# Patient Record
Sex: MALE | Race: WHITE | Employment: FULL TIME | URBAN - METROPOLITAN AREA
[De-identification: names, ages, dates, MRNs, and addresses within clinical notes are randomized per-mention and may not be internally consistent; named-entity substitution may affect disease eponyms.]

---

## 2017-08-17 ENCOUNTER — OFFICE VISIT (OUTPATIENT)
Dept: FAMILY MEDICINE | Facility: CLINIC | Age: 22
End: 2017-08-17
Payer: COMMERCIAL

## 2017-08-17 VITALS
TEMPERATURE: 97.7 F | BODY MASS INDEX: 23.38 KG/M2 | WEIGHT: 167 LBS | SYSTOLIC BLOOD PRESSURE: 126 MMHG | OXYGEN SATURATION: 96 % | DIASTOLIC BLOOD PRESSURE: 64 MMHG | HEART RATE: 51 BPM | HEIGHT: 71 IN

## 2017-08-17 DIAGNOSIS — R07.0 THROAT PAIN: Primary | ICD-10-CM

## 2017-08-17 LAB
DEPRECATED S PYO AG THROAT QL EIA: NORMAL
SPECIMEN SOURCE: NORMAL

## 2017-08-17 PROCEDURE — 99213 OFFICE O/P EST LOW 20 MIN: CPT | Performed by: NURSE PRACTITIONER

## 2017-08-17 PROCEDURE — 87880 STREP A ASSAY W/OPTIC: CPT | Performed by: NURSE PRACTITIONER

## 2017-08-17 PROCEDURE — 87081 CULTURE SCREEN ONLY: CPT | Performed by: NURSE PRACTITIONER

## 2017-08-17 NOTE — MR AVS SNAPSHOT
After Visit Summary   8/17/2017    Tyshawn Gonzalez    MRN: [id number]           Patient Information     Date Of Birth          1995        Visit Information        Provider Department      8/17/2017 11:20 AM Kiersten Shields APRN [facility]        Today's Diagnoses     Throat pain    -  1      Care Instructions    Increase rest and fluids. Tylenol and/or Ibuprofen for comfort. Cool mist vaporizer. If your symptoms worsen or do not resolve follow up with your primary care provider as needed     Mucinex 600 mg 12 hr formula can help with post nasal drip which can cause a very sore throat.     Indications for emergent return to emergency department discussed with patient, who verbalized good understanding and agreement.  Patient understands the limitations of today's evaluation.           Self-Care for Sore Throats    Sore throats happen for many reasons, such as colds, allergies, and infections caused by viruses or bacteria. In any case, your throat becomes red and sore. Your goal for self-care is to reduce your discomfort while giving your throat a chance to heal.  Moisten and soothe your throat  Tips include the following:    Try a sip of water first thing after waking up.    Keep your throat moist by drinking 6 or more glasses of clear liquids every day.    Run a cool-air humidifier in your room overnight.    Avoid cigarette smoke.     Suck on throat lozenges, cough drops, hard candy, ice chips, or frozen fruit-juice bars. Use the sugar-free versions if your diet or medical condition requires them.  Gargle to ease irritation  Gargling every hour or 2 can ease irritation. Try gargling with 1 of these solutions:    1/4 teaspoon of salt in 1/2 cup of warm water    An over-the-counter anesthetic gargle  Use medicine for more relief  Over-the-counter medicine can reduce sore throat symptoms. Ask your pharmacist if you have questions about which medicine to use:    Ease pain  with anesthetic sprays. Aspirin or an aspirin substitute also helps. Remember, never give aspirin to anyone 18 or younger, or if you are already taking blood thinners.     For sore throats caused by allergies, try antihistamines to block the allergic reaction.    Remember: unless a sore throat is caused by a bacterial infection, antibiotics won t help you.  Prevent future sore throats  Prevention tips include the following:    Stop smoking or reduce contact with secondhand smoke. Smoke irritates the tender throat lining.    Limit contact with pets and with allergy-causing substances, such as pollen and mold.    When you re around someone with a sore throat or cold, wash your hands often to keep viruses or bacteria from spreading.    Don t strain your vocal cords.  Call your healthcare provider  Contact your healthcare provider if you have:    A temperature over 101 F (38.3 C)    White spots on the throat    Great difficulty swallowing    Trouble breathing    A skin rash    Recent exposure to someone else with strep bacteria    Severe hoarseness and swollen glands in the neck or jaw   Date Last Reviewed: 8/1/2016 2000-2017 [employer]). [address] [place] [address]. All rights reserved. This information is not intended as a substitute for professional medical care. Always follow your healthcare professional's instructions.                Follow-ups after your visit        Additional Services     OTOLARYNGOLOGY REFERRAL       Your provider has referred you to: [facility]: [facility] [phone]   [email or web address]    Pt requesting ENT referral as he is afraid he has cancer in his tonsils because he states at times they look and feel terrible.  History of smoking.  Normal exam today.      Please be aware that coverage of these services is subject to the terms and limitations of your health insurance plan.  Call member services at your health plan  "with any benefit or coverage questions.      Please bring the following with you to your appointment:    (1) Any X-Rays, CTs or MRIs which have been performed.  Contact the facility where they were done to arrange for  prior to your scheduled appointment.   (2) List of current medications  (3) This referral request   (4) Any documents/labs given to you for this referral                  Follow-up notes from your care team     See patient instructions section of the AVS Return if symptoms worsen or fail to improve.      Who to contact     If you have questions or need follow up information about today's clinic visit or your schedule please contact [facility] directly at [phone].  Normal or non-critical lab and imaging results will be communicated to you by MyChart, letter or phone within 4 business days after the clinic has received the results. If you do not hear from us within 7 days, please contact the clinic through MyChart or phone. If you have a critical or abnormal lab result, we will notify you by phone as soon as possible.  Submit refill requests through [employer] or call your pharmacy and they will forward the refill request to us. Please allow 3 business days for your refill to be completed.          Additional Information About Your Visit        MyChart Information     Balanced lets you send messages to your doctor, view your test results, renew your prescriptions, schedule appointments and more. To sign up, go to [email or web address] . Click on \"Log in\" on the left side of the screen, which will take you to the Welcome page. Then click on \"Sign up Now\" on the right side of the page.     You will be asked to enter the access code listed below, as well as some personal information. Please follow the directions to create your username and password.     Your access code is: [email or web address]  Expires: 11/15/2017 11:49 AM     Your access code will  in 90 days. If you need help " "or a new code, please call your [place] clinic or [phone].        Care EveryWhere ID     This is your Care EveryWhere ID. This could be used by other organizations to access your [place] medical records  [id number]        Your Vitals Were     Pulse Temperature Height Pulse Oximetry BMI (Body Mass Index)       51 97.7  F (36.5  C) (Tympanic) 5' 11\" (1.803 m) 96% 23.29 kg/m2        Blood Pressure from Last 3 Encounters:   08/17/17 126/64   09/20/16 126/72    Weight from Last 3 Encounters:   08/17/17 167 lb (75.8 kg)              We Performed the Following     Beta strep group A culture     OTOLARYNGOLOGY REFERRAL     Strep, Rapid Screen        Primary Care Provider    None       No address on file        Equal Access to Services     EUNICE GARCIA : Hademily Harrison, waaxda luqadaha, qaybta kaalmada adeegyada, isaias gaona . So [facility] [phone].    ATENCIÓN: Si habla español, tiene a appiah disposición servicios gratuitos de asistencia lingüística. Llame al [phone].    We comply with applicable federal civil rights laws and [place] laws. We do not discriminate on the basis of race, color, national origin, age, disability sex, sexual orientation or gender identity.            Thank you!     Thank you for choosing [facility]  for your care. Our goal is always to provide you with excellent care. Hearing back from our patients is one way we can continue to improve our services. Please take a few minutes to complete the written survey that you may receive in the mail after your visit with us. Thank you!             Your Updated Medication List - Protect others around you: Learn how to safely use, store and throw away your medicines at www.disposemymeds.org.      Notice  As of 8/17/2017 12:01 PM    You have not been prescribed any medications.      "

## 2017-08-17 NOTE — LETTER
August 18, 2017      Tyshawn Gonzalez  [facility] [address]        Dear Tyshawn,           This letter is to inform you that the results of your recent throat culture are negative.  If you have any questions please call or make an appointment.            Sincerely,        VANNESA Limon CNP

## 2017-08-17 NOTE — PROGRESS NOTES
"  SUBJECTIVE:                                                    Tyshawn Gonzalez is a 22 year old male who presents to clinic today for the following health issues:      Sore throat       Duration: [place]- been bad ever since just never made it in for an appointment     Description (location/character/radiation): throat     Intensity:  mild, moderate    Accompanying signs and symptoms: Tonsils feel beat up and would like them to get checked out     History (similar episodes/previous evaluation): None    Precipitating or alleviating factors: None    Therapies tried and outcome: None     No sore throat today. Only happens once in awhile.        Problem list and histories reviewed & adjusted, as indicated.  Additional history: as documented    There is no problem list on file for this patient.    History reviewed. No pertinent surgical history.    Social History   Substance Use Topics     Smoking status: Current Every Day Smoker     Packs/day: 0.25     Years: 5.00     Smokeless tobacco: Former User     Alcohol use Not on file     History reviewed. No pertinent family history.      No current outpatient prescriptions on file.     No Known Allergies  Labs reviewed in EPIC      Reviewed and updated as needed this visit by clinical staffTobacco  Allergies  Meds  Problems  Med Hx  Surg Hx  Fam Hx  Soc Hx        Reviewed and updated as needed this visit by Provider  Allergies  Meds  Problems         ROS:  Constitutional, HEENT, cardiovascular, pulmonary, GI, , musculoskeletal, neuro, skin, endocrine and psych systems are negative, except as otherwise noted.      OBJECTIVE:   /64  Pulse 51  Temp 97.7  F (36.5  C) (Tympanic)  Ht 5' 11\" (1.803 m)  Wt 167 lb (75.8 kg)  SpO2 96%  BMI 23.29 kg/m2  Body mass index is 23.29 kg/(m^2).   GENERAL: healthy, alert and no distress, nontoxic in appearance  EYES: Eyes grossly normal to inspection, PERRL and conjunctivae and sclerae normal  HENT: ear canals and TM's " normal, nose and mouth without ulcers or lesions  NECK: no adenopathy, supple with full ROM  RESP: lungs clear to auscultation - no rales, rhonchi or wheezes  CV: regular rate and rhythm, normal S1 S2, no S3 or S4, no murmur, click or rub, no peripheral edema   ABDOMEN: soft, nontender, no hepatosplenomegaly, no masses and bowel sounds normal  MS: no gross musculoskeletal defects noted, no edema  No rash    Diagnostic Test Results:  Results for orders placed or performed in visit on 08/17/17 (from the past 24 hour(s))   Strep, Rapid Screen   Result Value Ref Range    Specimen Description Throat     Rapid Strep A Screen       NEGATIVE: No Group A streptococcal antigen detected by immunoassay, await culture report.       ASSESSMENT/PLAN:   Pt wants a referral to specialist as he is afraid he has cancer in his tonsils. I did explain to him that he has normal exam today and his throat is not sore today. He is very worried. Will refer to ENT.  Problem List Items Addressed This Visit     None      Visit Diagnoses     Throat pain    -  Primary    Relevant Orders    Strep, Rapid Screen (Completed)    Beta strep group A culture (Completed)    OTOLARYNGOLOGY REFERRAL               Patient Instructions     Increase rest and fluids. Tylenol and/or Ibuprofen for comfort. Cool mist vaporizer. If your symptoms worsen or do not resolve follow up with your primary care provider as needed     Mucinex 600 mg 12 hr formula can help with post nasal drip which can cause a very sore throat.     Indications for emergent return to emergency department discussed with patient, who verbalized good understanding and agreement.  Patient understands the limitations of today's evaluation.           Self-Care for Sore Throats    Sore throats happen for many reasons, such as colds, allergies, and infections caused by viruses or bacteria. In any case, your throat becomes red and sore. Your goal for self-care is to reduce your discomfort while giving  your throat a chance to heal.  Moisten and soothe your throat  Tips include the following:    Try a sip of water first thing after waking up.    Keep your throat moist by drinking 6 or more glasses of clear liquids every day.    Run a cool-air humidifier in your room overnight.    Avoid cigarette smoke.     Suck on throat lozenges, cough drops, hard candy, ice chips, or frozen fruit-juice bars. Use the sugar-free versions if your diet or medical condition requires them.  Gargle to ease irritation  Gargling every hour or 2 can ease irritation. Try gargling with 1 of these solutions:    1/4 teaspoon of salt in 1/2 cup of warm water    An over-the-counter anesthetic gargle  Use medicine for more relief  Over-the-counter medicine can reduce sore throat symptoms. Ask your pharmacist if you have questions about which medicine to use:    Ease pain with anesthetic sprays. Aspirin or an aspirin substitute also helps. Remember, never give aspirin to anyone 18 or younger, or if you are already taking blood thinners.     For sore throats caused by allergies, try antihistamines to block the allergic reaction.    Remember: unless a sore throat is caused by a bacterial infection, antibiotics won t help you.  Prevent future sore throats  Prevention tips include the following:    Stop smoking or reduce contact with secondhand smoke. Smoke irritates the tender throat lining.    Limit contact with pets and with allergy-causing substances, such as pollen and mold.    When you re around someone with a sore throat or cold, wash your hands often to keep viruses or bacteria from spreading.    Don t strain your vocal cords.  Call your healthcare provider  Contact your healthcare provider if you have:    A temperature over 101 F (38.3 C)    White spots on the throat    Great difficulty swallowing    Trouble breathing    A skin rash    Recent exposure to someone else with strep bacteria    Severe hoarseness and swollen glands in the neck or  jaw   Date Last Reviewed: 8/1/2016 2000-2017 [employer]. [address] [place] [address], [place] [address]. All rights reserved. This information is not intended as a substitute for professional medical care. Always follow your healthcare professional's instructions.            VANNESA Limon [facility]

## 2017-08-17 NOTE — NURSING NOTE
"Chief Complaint   Patient presents with     Pharyngitis       Initial /64  Pulse 51  Temp 97.7  F (36.5  C) (Tympanic)  Ht 5' 11\" (1.803 m)  Wt 167 lb (75.8 kg)  SpO2 96%  BMI 23.29 kg/m2 Estimated body mass index is 23.29 kg/(m^2) as calculated from the following:    Height as of this encounter: 5' 11\" (1.803 m).    Weight as of this encounter: 167 lb (75.8 kg).  Medication Reconciliation: complete    Health Maintenance that is potentially due pending provider review:  Immuniaztions     Patient will do health maintenance another time as he is not feeling good today. Matthew Donaldson, prashanth    Is there anyone who you would like to be able to receive your results? No  If yes have patient fill out RACHID      "

## 2017-08-17 NOTE — PATIENT INSTRUCTIONS
Increase rest and fluids. Tylenol and/or Ibuprofen for comfort. Cool mist vaporizer. If your symptoms worsen or do not resolve follow up with your primary care provider as needed     Mucinex 600 mg 12 hr formula can help with post nasal drip which can cause a very sore throat.     Indications for emergent return to emergency department discussed with patient, who verbalized good understanding and agreement.  Patient understands the limitations of today's evaluation.           Self-Care for Sore Throats    Sore throats happen for many reasons, such as colds, allergies, and infections caused by viruses or bacteria. In any case, your throat becomes red and sore. Your goal for self-care is to reduce your discomfort while giving your throat a chance to heal.  Moisten and soothe your throat  Tips include the following:    Try a sip of water first thing after waking up.    Keep your throat moist by drinking 6 or more glasses of clear liquids every day.    Run a cool-air humidifier in your room overnight.    Avoid cigarette smoke.     Suck on throat lozenges, cough drops, hard candy, ice chips, or frozen fruit-juice bars. Use the sugar-free versions if your diet or medical condition requires them.  Gargle to ease irritation  Gargling every hour or 2 can ease irritation. Try gargling with 1 of these solutions:    1/4 teaspoon of salt in 1/2 cup of warm water    An over-the-counter anesthetic gargle  Use medicine for more relief  Over-the-counter medicine can reduce sore throat symptoms. Ask your pharmacist if you have questions about which medicine to use:    Ease pain with anesthetic sprays. Aspirin or an aspirin substitute also helps. Remember, never give aspirin to anyone 18 or younger, or if you are already taking blood thinners.     For sore throats caused by allergies, try antihistamines to block the allergic reaction.    Remember: unless a sore throat is caused by a bacterial infection, antibiotics won t help  you.  Prevent future sore throats  Prevention tips include the following:    Stop smoking or reduce contact with secondhand smoke. Smoke irritates the tender throat lining.    Limit contact with pets and with allergy-causing substances, such as pollen and mold.    When you re around someone with a sore throat or cold, wash your hands often to keep viruses or bacteria from spreading.    Don t strain your vocal cords.  Call your healthcare provider  Contact your healthcare provider if you have:    A temperature over 101 F (38.3 C)    White spots on the throat    Great difficulty swallowing    Trouble breathing    A skin rash    Recent exposure to someone else with strep bacteria    Severe hoarseness and swollen glands in the neck or jaw   Date Last Reviewed: 8/1/2016 2000-2017 [employer]. [address] [place] [address], [place] [address]. All rights reserved. This information is not intended as a substitute for professional medical care. Always follow your healthcare professional's instructions.

## 2017-08-18 LAB
BACTERIA SPEC CULT: NORMAL
SPECIMEN SOURCE: NORMAL

## 2018-03-19 ENCOUNTER — OFFICE VISIT (OUTPATIENT)
Dept: FAMILY MEDICINE | Facility: CLINIC | Age: 23
End: 2018-03-19
Payer: COMMERCIAL

## 2018-03-19 VITALS
BODY MASS INDEX: 24.08 KG/M2 | HEIGHT: 71 IN | DIASTOLIC BLOOD PRESSURE: 64 MMHG | SYSTOLIC BLOOD PRESSURE: 100 MMHG | TEMPERATURE: 98.2 F | HEART RATE: 76 BPM | WEIGHT: 172 LBS

## 2018-03-19 DIAGNOSIS — J06.9 VIRAL URI WITH COUGH: Primary | ICD-10-CM

## 2018-03-19 DIAGNOSIS — R07.0 THROAT PAIN: ICD-10-CM

## 2018-03-19 LAB
DEPRECATED S PYO AG THROAT QL EIA: NORMAL
SPECIMEN SOURCE: NORMAL

## 2018-03-19 PROCEDURE — 87880 STREP A ASSAY W/OPTIC: CPT | Performed by: NURSE PRACTITIONER

## 2018-03-19 PROCEDURE — 99213 OFFICE O/P EST LOW 20 MIN: CPT | Performed by: NURSE PRACTITIONER

## 2018-03-19 PROCEDURE — 87081 CULTURE SCREEN ONLY: CPT | Performed by: NURSE PRACTITIONER

## 2018-03-19 NOTE — PROGRESS NOTES
"  SUBJECTIVE:   Tyshawn Gonzalez is a 22 year old male who presents to clinic today for the following health issues:      ENT Symptoms             Symptoms: cc Present Absent Comment   Fever/Chills   x    Fatigue  x     Muscle Aches  x  mild   Eye Irritation   x    Sneezing   x    Nasal Chad/Drg  x  chad   Sinus Pressure/Pain x      Loss of smell  x     Dental pain   x    Sore Throat x      Swollen Glands   x    Ear Pain/Fullness  x  Ringing    Cough  x     Wheeze   x    Chest Pain   x    Shortness of breath   x    Rash   x    Other   x      Symptom duration:  3-4 days    Symptom severity:  mild    Treatments tried:  dayquil    Contacts:  strep exposure         Problem list and histories reviewed & adjusted, as indicated.  Additional history: as documented    There is no problem list on file for this patient.    History reviewed. No pertinent surgical history.    Social History   Substance Use Topics     Smoking status: Current Every Day Smoker     Packs/day: 0.25     Years: 5.00     Smokeless tobacco: Former User     Alcohol use Not on file     History reviewed. No pertinent family history.      No current outpatient prescriptions on file.     No Known Allergies  Labs reviewed in EPIC    Reviewed and updated as needed this visit by clinical staff       Reviewed and updated as needed this visit by Provider         ROS:  Constitutional, HEENT, cardiovascular, pulmonary, gi and gu systems are negative, except as otherwise noted.    OBJECTIVE:     /64 (Cuff Size: Adult Regular)  Pulse 76  Temp 98.2  F (36.8  C) (Tympanic)  Ht 5' 11\" (1.803 m)  Wt 172 lb (78 kg)  BMI 23.99 kg/m2  Body mass index is 23.99 kg/(m^2).  GENERAL: healthy, alert and no distress  HENT: normal cephalic/atraumatic, ear canals and TM's normal, nose and mouth without ulcers or lesions, nasal mucosa edematous , oropharynx clear, oral mucous membranes moist and sinuses: not tender  NECK: no adenopathy  RESP: lungs clear to auscultation - no " rales, rhonchi or wheezes  CV: regular rate and rhythm, normal S1 S2, no S3 or S4, no murmur  ABDOMEN: soft, nontender, and bowel sounds normal  PSYCH: mentation appears normal, affect normal/bright    Diagnostic Test Results:  Results for orders placed or performed in visit on 03/19/18 (from the past 24 hour(s))   Strep, Rapid Screen   Result Value Ref Range    Specimen Description Throat     Rapid Strep A Screen       NEGATIVE: No Group A streptococcal antigen detected by immunoassay, await culture report.       ASSESSMENT/PLAN:     1. Viral URI with cough  No acute distress.  Rapid strep negative, culture pending.  Symptoms likely viral at this time.  Symptomatic care and follow up discussed.    2. Throat pain    - Strep, Rapid Screen  - Beta strep group A culture    Home care instructions were reviewed with the patient. The risks, benefits and treatment options of prescribed medications or other treatments have been discussed with the patient. The patient verbalized their understanding and should call or follow up if no improvement or if they develop further problems.      Patient Instructions   Strep culture is pending will result in 24 hours.  We will contact you if it is positive and there is a change in treatment plan.    Symptomatic treatment with fluids, rest.  May use acetaminophen or ibuprofen as needed for pain or fever.  May return to work/school after 24 hours fever free.  Return to clinic if any worsening symptoms or if not improving.               VANNESA Lira [facility]

## 2018-03-19 NOTE — MR AVS SNAPSHOT
"              After Visit Summary   3/19/2018    Tyshawn Gonzalez    MRN: [id number]           Patient Information     Date Of Birth          1995        Visit Information        Provider Department      3/19/2018 1:00 PM Myla Cordon APRN CNP [facility]        Today's Diagnoses     Throat pain    -  1      Care Instructions    Strep culture is pending will result in 24 hours.  We will contact you if it is positive and there is a change in treatment plan.    Symptomatic treatment with fluids, rest.  May use acetaminophen or ibuprofen as needed for pain or fever.  May return to work/school after 24 hours fever free.  Return to clinic if any worsening symptoms or if not improving.                   Follow-ups after your visit        Who to contact     If you have questions or need follow up information about today's clinic visit or your schedule please contact [facility] directly at [phone].  Normal or non-critical lab and imaging results will be communicated to you by MyChart, letter or phone within 4 business days after the clinic has received the results. If you do not hear from us within 7 days, please contact the clinic through MyChart or phone. If you have a critical or abnormal lab result, we will notify you by phone as soon as possible.  Submit refill requests through BlooBox or call your pharmacy and they will forward the refill request to us. Please allow 3 business days for your refill to be completed.          Additional Information About Your Visit        MyChart Information     BlooBox lets you send messages to your doctor, view your test results, renew your prescriptions, schedule appointments and more. To sign up, go to [email or web address] . Click on \"Log in\" on the left side of the screen, which will take you to the Welcome page. Then click on \"Sign up Now\" on the right side of the page.     You will be asked to enter the access code listed " "below, as well as some personal information. Please follow the directions to create your username and password.     Your access code is: [email or web address]  Expires: 2018  1:45 PM     Your access code will  in 90 days. If you need help or a new code, please call your [facility] or [phone].        Care EveryWhere ID     This is your Care EveryWhere ID. This could be used by other organizations to access your [place] medical records  [id number]        Your Vitals Were     Pulse Temperature Height BMI (Body Mass Index)          76 98.2  F (36.8  C) (Tympanic) 5' 11\" (1.803 m) 23.99 kg/m2         Blood Pressure from Last 3 Encounters:   18 100/64   17 126/64   16 126/72    Weight from Last 3 Encounters:   18 172 lb (78 kg)   17 167 lb (75.8 kg)              We Performed the Following     Beta strep group A culture     Strep, Rapid Screen        Primary Care Provider Fax #    Physician No Ref-Primary [phone]       No address on file        Equal Access to Services     [facility] Center[facility]UPTI : Hadii karen Harrison, wadelvinda kate, qaybta kaalmada jatin, isaias ganoa . So [facility] [phone].    ATENCIÓN: Si habla español, tiene a appiah disposición servicios gratuitos de asistencia lingüística. Llame al [phone].    We comply with applicable federal civil rights laws and [place] laws. We do not discriminate on the basis of race, color, national origin, age, disability, sex, sexual orientation, or gender identity.            Thank you!     Thank you for choosing [facility]  for your care. Our goal is always to provide you with excellent care. Hearing back from our patients is one way we can continue to improve our services. Please take a few minutes to complete the written survey that you may receive in the mail after your visit with us. Thank you!             Your Updated Medication List - Protect others around you: " Learn how to safely use, store and throw away your medicines at www.disposemymeds.org.      Notice  As of 3/19/2018  1:45 PM    You have not been prescribed any medications.

## 2018-03-19 NOTE — PATIENT INSTRUCTIONS
Strep culture is pending will result in 24 hours.  We will contact you if it is positive and there is a change in treatment plan.    Symptomatic treatment with fluids, rest.  May use acetaminophen or ibuprofen as needed for pain or fever.  May return to work/school after 24 hours fever free.  Return to clinic if any worsening symptoms or if not improving.

## 2018-03-19 NOTE — LETTER
March 20, 2018      Tyshawn Gonzalez  [address] [address]        Dear Tyshawn,         This letter is to inform you that the results of your recent throat culture are negative.  If you have any questions please call or make an appointment.          Sincerely,        VANNESA Lira CNP

## 2018-03-20 LAB
BACTERIA SPEC CULT: NORMAL
SPECIMEN SOURCE: NORMAL

## 2018-05-24 ENCOUNTER — HOSPITAL ENCOUNTER (EMERGENCY)
Facility: CLINIC | Age: 23
Discharge: HOME | End: 2018-05-24
Attending: PHYSICIAN ASSISTANT | Admitting: PHYSICIAN ASSISTANT
Payer: COMMERCIAL

## 2018-05-24 VITALS
HEART RATE: 62 BPM | RESPIRATION RATE: 18 BRPM | HEIGHT: 72 IN | DIASTOLIC BLOOD PRESSURE: 90 MMHG | TEMPERATURE: 98 F | BODY MASS INDEX: 23.03 KG/M2 | OXYGEN SATURATION: 97 % | SYSTOLIC BLOOD PRESSURE: 149 MMHG | WEIGHT: 170 LBS

## 2018-05-24 DIAGNOSIS — S41.111A LACERATION OF MULTIPLE SITES OF RIGHT UPPER EXTREMITY, INITIAL ENCOUNTER: Primary | ICD-10-CM

## 2018-05-24 PROCEDURE — 90471 IMMUNIZATION ADMIN: CPT

## 2018-05-24 PROCEDURE — 25000128 H RX IP 250 OP 636: Performed by: PHYSICIAN ASSISTANT

## 2018-05-24 PROCEDURE — 90715 TDAP VACCINE 7 YRS/> IM: CPT | Performed by: PHYSICIAN ASSISTANT

## 2018-05-24 PROCEDURE — 12001 RPR S/N/AX/GEN/TRNK 2.5CM/<: CPT | Performed by: PHYSICIAN ASSISTANT

## 2018-05-24 PROCEDURE — 12001 RPR S/N/AX/GEN/TRNK 2.5CM/<: CPT

## 2018-05-24 PROCEDURE — G0463 HOSPITAL OUTPT CLINIC VISIT: HCPCS | Mod: 25

## 2018-05-24 PROCEDURE — 99213 OFFICE O/P EST LOW 20 MIN: CPT | Mod: 25 | Performed by: PHYSICIAN ASSISTANT

## 2018-05-24 RX ADMIN — CLOSTRIDIUM TETANI TOXOID ANTIGEN (FORMALDEHYDE INACTIVATED), CORYNEBACTERIUM DIPHTHERIAE TOXOID ANTIGEN (FORMALDEHYDE INACTIVATED), BORDETELLA PERTUSSIS TOXOID ANTIGEN (GLUTARALDEHYDE INACTIVATED), BORDETELLA PERTUSSIS FILAMENTOUS HEMAGGLUTININ ANTIGEN (FORMALDEHYDE INACTIVATED), BORDETELLA PERTUSSIS PERTACTIN ANTIGEN, AND BORDETELLA PERTUSSIS FIMBRIAE 2/3 ANTIGEN 0.5 ML: 5; 2; 2.5; 5; 3; 5 INJECTION, SUSPENSION INTRAMUSCULAR at 19:03

## 2018-05-24 NOTE — ED AVS SNAPSHOT
[facility] Emergency Department    [address] [address]    Phone:  [phone]    Fax:  [phone]                                       Tyshawn Gonzalez   MRN: [id number]    Department:  [facility] Emergency Department   Date of Visit:  5/24/2018           Patient Information     Date Of Birth          1995        Your diagnoses for this visit were:     Laceration of multiple sites of right upper extremity, initial encounter        You were seen by Christy Landers PA-C.      Follow-up Information     Follow up with [facility]. Call in 7 days.    Specialty:  Family Practice    Why:  for one suture removal    Contact information:    [address] [place] [address] [phone]    Additional information:    The medical center is located at   [address] [place] [address] (between [address]   [address] [place], four miles north   of [place]).        Follow up with [facility] Emergency Department.    Specialty:  EMERGENCY MEDICINE    Why:  As needed, If symptoms worsen    Contact information:    [address] [address] [phone]    Additional information:    The medical center is located at   [address] [place] [address] (between [facility] [address]  in [place], four miles north   of [place]).      Discharge References/Attachments     LACERATION, ALL (ENGLISH)      24 Hour Appointment Hotline       To make an appointment at any [facility], call [phone] ([phone]). If you don't have a family doctor or clinic, we will help you find one. [facility] are conveniently located to serve the needs of you and your family.             Review of your medicines      Notice     You have not been prescribed any medications.            Orders Needing Specimen Collection     None      Pending Results     No orders found from 5/22/2018 to 5/25/2018.            Pending Culture Results     No orders found from  "2018 to 2018.            Pending Results Instructions     If you had any lab results that were not finalized at the time of your Discharge, you can call the ED Lab Result RN at [phone]. You will be contacted by this team for any positive Lab results or changes in treatment. The nurses are available 7 days a week from 10A to 6:30P.  You can leave a message 24 hours per day and they will return your call.        Test Results From Your Hospital Stay               Thank you for choosing [place]       Thank you for choosing [place] for your care. Our goal is always to provide you with excellent care. Hearing back from our patients is one way we can continue to improve our services. Please take a few minutes to complete the written survey that you may receive in the mail after you visit with us. Thank you!        [employer] Information     DeskActive lets you send messages to your doctor, view your test results, renew your prescriptions, schedule appointments and more. To sign up, go to [email or web address] . Click on \"Log in\" on the left side of the screen, which will take you to the Welcome page. Then click on \"Sign up Now\" on the right side of the page.     You will be asked to enter the access code listed below, as well as some personal information. Please follow the directions to create your username and password.     Your access code is: [email or web address]  Expires: 2018  1:45 PM     Your access code will  in 90 days. If you need help or a new code, please call your [place] clinic or [phone].        Care EveryWhere ID     This is your Care EveryWhere ID. This could be used by other organizations to access your [place] medical records  [id number]        Equal Access to Services     EUNICE GARCIA : Arthur Harrison, lukas love, isaias thayer. So [facility] [phone].    ATENCIÓN: Si habla español, tiene a appiah disposición " servicios gratuitos de asistencia lingüística. Ritu dotson [phone].    We comply with applicable federal civil rights laws and [place] laws. We do not discriminate on the basis of race, color, national origin, age, disability, sex, sexual orientation, or gender identity.            After Visit Summary       This is your record. Keep this with you and show to your community pharmacist(s) and doctor(s) at your next visit.

## 2018-05-24 NOTE — ED AVS SNAPSHOT
[facility] Emergency Department    [address] [address]    Phone:  [phone]    Fax:  [phone]                                       Tyshawn Gonzalez   MRN: [id number]    Department:  [facility] Emergency Department   Date of Visit:  5/24/2018           After Visit Summary Signature Page     I have received my discharge instructions, and my questions have been answered. I have discussed any challenges I see with this plan with the nurse or doctor.    ..........................................................................................................................................  Patient/Patient Representative Signature      ..........................................................................................................................................  Patient Representative Print Name and Relationship to Patient    ..................................................               ................................................  Date                                            Time    ..........................................................................................................................................  Reviewed by Signature/Title    ...................................................              ..............................................  Date                                                            Time

## 2018-05-24 NOTE — LETTER
May 24, 2018      To Whom It May Concern:      Tyshawn Gonzalez was seen in our Emergency Department today, 05/24/18.  Please excuse patient from climbing tomorrow at work.  Thank you.      Sincerely,        Christy Landers PA-C

## 2018-05-25 ENCOUNTER — ALLIED HEALTH/NURSE VISIT (OUTPATIENT)
Dept: FAMILY MEDICINE | Facility: CLINIC | Age: 23
End: 2018-05-25
Payer: COMMERCIAL

## 2018-05-25 DIAGNOSIS — Z48.00 ENCOUNTER FOR CHANGE OF DRESSING: Primary | ICD-10-CM

## 2018-05-25 PROCEDURE — 99207 ZZC NO CHARGE NURSE ONLY: CPT

## 2018-05-25 ASSESSMENT — ENCOUNTER SYMPTOMS
NEUROLOGICAL NEGATIVE: 1
WOUND: 1
CONSTITUTIONAL NEGATIVE: 1
MUSCULOSKELETAL NEGATIVE: 1

## 2018-05-25 NOTE — MR AVS SNAPSHOT
After Visit Summary   5/25/2018    Tyshawn Gonzalez    MRN: [id number]           Patient Information     Date Of Birth          1995        Visit Information        Provider Department      5/25/2018 3:45 PM FL NB RN [facility]        Today's Diagnoses     Encounter for change of dressing    -  1       Follow-ups after your visit        Who to contact     If you have questions or need follow up information about today's clinic visit or your schedule please contact [facility] directly at [phone].  Normal or non-critical lab and imaging results will be communicated to you by MyChart, letter or phone within 4 business days after the clinic has received the results. If you do not hear from us within 7 days, please contact the clinic through MyChart or phone. If you have a critical or abnormal lab result, we will notify you by phone as soon as possible.  Submit refill requests through Chapatiz or call your pharmacy and they will forward the refill request to us. Please allow 3 business days for your refill to be completed.          Additional Information About Your Visit        Care EveryWhere ID     This is your Care EveryWhere ID. This could be used by other organizations to access your [place] medical records  [id number]         Blood Pressure from Last 3 Encounters:   05/24/18 149/90   03/19/18 100/64   08/17/17 126/64    Weight from Last 3 Encounters:   05/24/18 170 lb (77.1 kg)   03/19/18 172 lb (78 kg)   08/17/17 167 lb (75.8 kg)              Today, you had the following     No orders found for display       Primary Care Provider Fax #    Physician No Ref-Primary [phone]       No address on file        Equal Access to Services     [facility] Presbyterian[facility]UPTI : Hadii karen mcdonald [place], waaxda luqadaha, qaybta kaalisaias santana . So [facility] [phone].    ATENCIÓN: Si habla español, tiene a appiah disposición servicios  salazar de asistencia lingüística. Ritu dotson [phone].    We comply with applicable federal civil rights laws and [place] laws. We do not discriminate on the basis of race, color, national origin, age, disability, sex, sexual orientation, or gender identity.            Thank you!     Thank you for choosing [facility]  for your care. Our goal is always to provide you with excellent care. Hearing back from our patients is one way we can continue to improve our services. Please take a few minutes to complete the written survey that you may receive in the mail after your visit with us. Thank you!             Your Updated Medication List - Protect others around you: Learn how to safely use, store and throw away your medicines at www.disposemymeds.org.      Notice  As of 5/25/2018  3:59 PM    You have not been prescribed any medications.

## 2018-05-25 NOTE — ED PROVIDER NOTES
History     Chief Complaint   Patient presents with     Laceration     to R forearm and palm of hand.  pt opening pop bottle that broke and cut him.  uncertain about tetanus status     HPI  Tyshawn Gonzalez is a 22 year old male who presents with complaints of lacerations to right forearm today.  He states he was attempting to open a glass root beer bottle when the top of the bottle broke and cut him in several places on his right forearm and right hand.  Wounds continue to ooze blood.  He is moving his hand and fingers without difficulties.  Pt is unsure when his last tetanus was.    Problem List:    There are no active problems to display for this patient.       Past Medical History:    History reviewed. No pertinent past medical history.    Past Surgical History:    History reviewed. No pertinent surgical history.    Family History:    No family history on file.    Social History:  Marital Status:  Single [1]  Social History   Substance Use Topics     Smoking status: Current Every Day Smoker     Packs/day: 0.25     Years: 5.00     Smokeless tobacco: Former User     Alcohol use Not on file        Medications:      No current outpatient prescriptions on file.      Review of Systems   Constitutional: Negative.    Musculoskeletal: Negative.    Skin: Positive for wound.   Neurological: Negative.    All other systems reviewed and are negative.      Physical Exam   BP: 149/90  Pulse: 62  Temp: 98  F (36.7  C)  Resp: 18  Height: 182.9 cm (6')  Weight: 77.1 kg (170 lb)  SpO2: 97 %      Physical Exam   Constitutional: He appears well-developed and well-nourished. No distress.   HENT:   Head: Normocephalic and atraumatic.   Cardiovascular: Intact distal pulses.    Pulmonary/Chest: Effort normal.   Musculoskeletal:        Right wrist: He exhibits laceration. He exhibits normal range of motion, no bony tenderness and no swelling.        Right forearm: He exhibits laceration. He exhibits no bony tenderness and no swelling.         Arms:       Right hand: He exhibits laceration. He exhibits normal range of motion, no bony tenderness and no swelling. Normal sensation noted. Normal strength noted.        Hands:  Approximately 5 cm lens-shaped laceration to volar aspect of right distal forearm with tissue deficit from the wound.  Approximately 3 cm laceration to right hand over thenar eminence with central tissue loss.  A 1 cm superficial laceration to right wrist.  No tendon involvement.  Full active and passive ROM of fingers and wrist.  Full strength of fingers and wrist.  Sensation intact.   Neurological: He is alert. He has normal strength. No sensory deficit.   Skin: Skin is warm and dry.       ED Course     ED Course     Procedures    No results found for this or any previous visit (from the past 24 hour(s)).    Medications   Tdap (tetanus-diphtheria-acell pertussis) (ADACEL) injection 0.5 mL (0.5 mLs Intramuscular Given 5/24/18 1903)       Assessments & Plan (with Medical Decision Making)     Pt is a 22 year old male who presents with complaints of lacerations to right forearm today.  He states he was attempting to open a glass root beer bottle when the top of the bottle broke and cut him in several places on his right forearm and right hand.  Wounds continue to ooze blood.  He is moving his hand and fingers without difficulties.  Pt is afebrile on arrival.  Exam as above.  Tetanus was updated today.  Wounds were cleaned.  Surgicel was placed over wounds with good hemostasis.  One suture was placed in the hand wound in order to control bleeding of a venous bleeder along the proximal wound edge.  I was unable to suture the wounds as there was a significant tissue deficit from each of the lacerations, which would have put the lacerations under significant tension should I have attempted to close them with sutures.  Dressings were placed.  Return precautions were reviewed.  Hand-outs were provided.    Patient was instructed to follow-up  with PCP if no improvement in 5-7 days for continued care and management or sooner if new or worsening symptoms.  He is to return to the ED for persistent and/or worsening symptoms.  Patient expressed understanding of the diagnosis and plan and was discharged home in good condition.    I have reviewed the nursing notes.    I have reviewed the findings, diagnosis, plan and need for follow up with the patient.    There are no discharge medications for this patient.      Final diagnoses:   Laceration of multiple sites of right upper extremity, initial encounter       5/24/2018   [facility] EMERGENCY DEPARTMENT     Christy Landers PA-C  05/25/18 1002

## 2018-05-25 NOTE — PROGRESS NOTES
S-(situation): patient is walk in to clinic requesting new dressings    B-(background): was in the ED yesterday with laceration to right forearm.  Wound not bleeding.  Surrounding tissue is pink.      A-(assessment): right forearm laceration    R-(recommendations): dressing change done.  Paulina Mackay RN

## 2020-07-16 ENCOUNTER — OFFICE VISIT (OUTPATIENT)
Dept: FAMILY MEDICINE | Facility: CLINIC | Age: 25
End: 2020-07-16
Payer: COMMERCIAL

## 2020-07-16 VITALS
HEART RATE: 48 BPM | RESPIRATION RATE: 16 BRPM | WEIGHT: 172 LBS | DIASTOLIC BLOOD PRESSURE: 82 MMHG | TEMPERATURE: 97.1 F | OXYGEN SATURATION: 98 % | BODY MASS INDEX: 23.3 KG/M2 | HEIGHT: 72 IN | SYSTOLIC BLOOD PRESSURE: 139 MMHG

## 2020-07-16 DIAGNOSIS — K14.6 TONGUE PAIN: Primary | ICD-10-CM

## 2020-07-16 PROCEDURE — 99213 OFFICE O/P EST LOW 20 MIN: CPT | Performed by: FAMILY MEDICINE

## 2020-07-16 SDOH — HEALTH STABILITY: MENTAL HEALTH: HOW OFTEN DO YOU HAVE 6 OR MORE DRINKS ON ONE OCCASION?: MONTHLY

## 2020-07-16 SDOH — HEALTH STABILITY: MENTAL HEALTH: HOW MANY STANDARD DRINKS CONTAINING ALCOHOL DO YOU HAVE ON A TYPICAL DAY?: 5 OR 6

## 2020-07-16 SDOH — HEALTH STABILITY: MENTAL HEALTH: HOW OFTEN DO YOU HAVE A DRINK CONTAINING ALCOHOL?: 2-4 TIMES A MONTH

## 2020-07-16 ASSESSMENT — MIFFLIN-ST. JEOR: SCORE: 1813.19

## 2020-07-16 NOTE — NURSING NOTE
Initial /82   Pulse (!) 48   Temp 97.1  F (36.2  C) (Tympanic)   Resp 16   Ht 1.829 m (6')   Wt 78 kg (172 lb)   SpO2 98%   BMI 23.33 kg/m   Estimated body mass index is 23.33 kg/m  as calculated from the following:    Height as of this encounter: 1.829 m (6').    Weight as of this encounter: 78 kg (172 lb). .    Yanelis Conde CMA([facility])

## 2020-07-16 NOTE — PROGRESS NOTES
Tyshawn Gonzalez is a 24 year old male comes in today with the following concerns.      1. Sore on underside of tongue. Feels has been present for at least a year. Has gotten larger in size. Is painful at times. Feels like a bump on his tongue. Thought it was like a tonsil stone at first.     Yanelis Conde CMA([facility])      *    S :Tyshawn Gonzalez is a 24 year old male who feels a lump/mass R side of tongue under it.  A year or more, growing some, some pain at times.      No wt loss or fever . Used to smoke    Problem list, med list, additional histories reviewed and updated, as indicated.      O:/82   Pulse (!) 48   Temp 97.1  F (36.2  C) (Tympanic)   Resp 16   Ht 1.829 m (6')   Wt 78 kg (172 lb)   SpO2 98%   BMI 23.33 kg/m    GEN: Alert and oriented, in no acute distress  Maybe some more prominence to  Some of the glandular tissue under jaw line R base of mouth  When he lifts tongue.  Not much different that other side howeve.r     No obvious mass or growth or other finding of concern    A :tongue pain, nos    P: reassured him no mass or growth identified, but he still feels something is going on there.  Is interested in ENT exam to get another opinion.  Gave him consult number, he will call if continuing to bother.

## 2021-12-23 ENCOUNTER — HOSPITAL ENCOUNTER (EMERGENCY)
Facility: CLINIC | Age: 26
Discharge: HOME | End: 2021-12-23
Attending: FAMILY MEDICINE | Admitting: FAMILY MEDICINE
Payer: COMMERCIAL

## 2021-12-23 VITALS
TEMPERATURE: 99.2 F | HEART RATE: 82 BPM | WEIGHT: 170 LBS | SYSTOLIC BLOOD PRESSURE: 135 MMHG | BODY MASS INDEX: 23.06 KG/M2 | RESPIRATION RATE: 18 BRPM | DIASTOLIC BLOOD PRESSURE: 74 MMHG | OXYGEN SATURATION: 98 %

## 2021-12-23 DIAGNOSIS — J11.1 INFLUENZA WITH RESPIRATORY MANIFESTATION OTHER THAN PNEUMONIA: ICD-10-CM

## 2021-12-23 DIAGNOSIS — M79.10 MYALGIA: ICD-10-CM

## 2021-12-23 DIAGNOSIS — R05.9 COUGH: ICD-10-CM

## 2021-12-23 DIAGNOSIS — R09.81 NASAL CONGESTION: ICD-10-CM

## 2021-12-23 PROCEDURE — 99284 EMERGENCY DEPT VISIT MOD MDM: CPT | Performed by: FAMILY MEDICINE

## 2021-12-23 PROCEDURE — 99283 EMERGENCY DEPT VISIT LOW MDM: CPT | Performed by: FAMILY MEDICINE

## 2021-12-23 RX ORDER — ONDANSETRON 4 MG/1
4 TABLET, ORALLY DISINTEGRATING ORAL EVERY 8 HOURS PRN
Qty: 10 TABLET | Refills: 0 | Status: SHIPPED | OUTPATIENT
Start: 2021-12-23

## 2021-12-23 RX ORDER — MELOXICAM 15 MG/1
15 TABLET ORAL DAILY
Qty: 10 TABLET | Refills: 0 | Status: SHIPPED | OUTPATIENT
Start: 2021-12-23 | End: 2021-12-23

## 2021-12-23 RX ORDER — MELOXICAM 15 MG/1
15 TABLET ORAL DAILY
Qty: 10 TABLET | Refills: 0 | Status: SHIPPED | OUTPATIENT
Start: 2021-12-23

## 2021-12-23 RX ORDER — ONDANSETRON 4 MG/1
4 TABLET, ORALLY DISINTEGRATING ORAL EVERY 8 HOURS PRN
Qty: 10 TABLET | Refills: 0 | Status: SHIPPED | OUTPATIENT
Start: 2021-12-23 | End: 2021-12-23

## 2021-12-23 RX ORDER — OSELTAMIVIR PHOSPHATE 75 MG/1
75 CAPSULE ORAL 2 TIMES DAILY
Qty: 10 CAPSULE | Refills: 0 | Status: SHIPPED | OUTPATIENT
Start: 2021-12-23

## 2021-12-23 RX ORDER — OSELTAMIVIR PHOSPHATE 75 MG/1
75 CAPSULE ORAL 2 TIMES DAILY
Qty: 10 CAPSULE | Refills: 0 | Status: SHIPPED | OUTPATIENT
Start: 2021-12-23 | End: 2021-12-23

## 2021-12-24 NOTE — ED PROVIDER NOTES
History     Chief Complaint   Patient presents with     Generalized Body Aches     HPI  Tyshawn Gonzalez is a 26 year old male who presents to the ER with concerns about body aches, chills, cough, some chest heaviness associated with his cough at started earlier today.  Patient states that his brother tested positive for influenza A yesterday.  Patient states that he is healthy other than that he smokes weed daily.  He is interested in starting an antiinfluenza medicine.  Denies any nausea or vomiting symptoms.  Denies diarrhea.  Denies any skin rash.      Allergies:  No Known Allergies    Problem List:    There are no problems to display for this patient.       Past Medical History:    History reviewed. No pertinent past medical history.    Past Surgical History:    History reviewed. No pertinent surgical history.    Family History:    History reviewed. No pertinent family history.    Social History:  Marital Status:  Single [1]  Social History     Tobacco Use     Smoking status: Light Tobacco Smoker     Packs/day: 0.25     Years: 5.00     Pack years: 1.25     Types: Cigarettes     Smokeless tobacco: Former User   Substance Use Topics     Alcohol use: Yes     Alcohol/week: 6.0 standard drinks     Types: 6 Cans of beer per week     Drug use: Yes     Types: Marijuana     Comment: mostly edible        Medications:    meloxicam (MOBIC) 15 MG tablet  ondansetron (ZOFRAN ODT) 4 MG ODT tab  oseltamivir (TAMIFLU) 75 MG capsule          Review of Systems   All other systems reviewed and are negative.      Physical Exam   BP: 135/74  Pulse: 82  Temp: 99.2  F (37.3  C)  Resp: 18  Weight: 77.1 kg (170 lb)  SpO2: 98 %      Physical Exam  Vitals and nursing note reviewed.   Constitutional:       General: He is in acute distress.      Appearance: He is ill-appearing. He is not toxic-appearing or diaphoretic.   HENT:      Head: Normocephalic and atraumatic.      Nose: Congestion present.   Cardiovascular:      Rate and Rhythm:  Normal rate.      Pulses: Normal pulses.   Pulmonary:      Effort: Pulmonary effort is normal. No respiratory distress.      Breath sounds: Normal breath sounds.   Abdominal:      Tenderness: There is no abdominal tenderness.   Musculoskeletal:      Cervical back: Normal range of motion and neck supple.   Skin:     Capillary Refill: Capillary refill takes less than 2 seconds.      Coloration: Skin is not cyanotic or pale.      Findings: No rash.   Neurological:      Mental Status: He is alert and oriented to person, place, and time.   Psychiatric:         Mood and Affect: Mood is anxious.         Behavior: Behavior normal.         ED Course                 Procedures              Critical Care time:  none               Assessments & Plan (with Medical Decision Making)  26-year-old male to the emergency room with concerns of likely influenza infection.  Patient exposed to his brother who tested positive for influenza A yesterday.  Patient's exam findings consistent with likely influenza/viral infection.  Patient given instructions on how to prevent spread of this infection to others.  Also handouts were sent home with him discussing influenza infection and I have asked him to read the handout greater should he have increase or worsening symptoms as it tracks.  Medications prescribed as listed below.     I have reviewed the nursing notes.    I have reviewed the findings, diagnosis, plan and need for follow up with the patient.       Discharge Medication List as of 12/23/2021  7:11 PM      START taking these medications    Details   meloxicam (MOBIC) 15 MG tablet Take 1 tablet (15 mg) by mouth daily for 10 days TAKE WITH FOOD AS NEEDED FOR PAIN. WEAN OFF OF THE MEDICATIONS AS YOUR SYMPTOMS IMPROVE., Disp-10 tablet, R-0, E-Prescribe      ondansetron (ZOFRAN ODT) 4 MG ODT tab Take 1 tablet (4 mg) by mouth every 8 hours as needed for nausea, Disp-10 tablet, R-0, E-Prescribe      oseltamivir (TAMIFLU) 75 MG capsule Take 1  capsule (75 mg) by mouth 2 times daily for 5 days, Disp-10 capsule, R-0, E-Prescribe                  I verbally discussed the findings of the evaluation today in the ER. I have verbally discussed with Tyshawn the suggested treatment(s) as described in the discharge instructions and handouts. I have prescribed the above listed medications and instructed him on appropriate use of these medications.      I have verbally suggested he follow-up in his clinic or return to the ER for increased symptoms. See the follow-up recommendations documented  in the after visit summary in this visit's EPIC chart.    Final diagnoses:   Influenza with respiratory manifestation other than pneumonia   Myalgia   Nasal congestion   Cough       12/23/2021   [facility] EMERGENCY DEPT     Lasha Hurtado DO  12/23/21 2058

## 2021-12-24 NOTE — ED TRIAGE NOTES
Brother tested positive for influenza. Today c/o body aches all over, chest hurts from coughing, fatigue, chills.